# Patient Record
Sex: MALE | Race: WHITE | Employment: UNEMPLOYED | URBAN - METROPOLITAN AREA
[De-identification: names, ages, dates, MRNs, and addresses within clinical notes are randomized per-mention and may not be internally consistent; named-entity substitution may affect disease eponyms.]

---

## 2024-03-25 ENCOUNTER — APPOINTMENT (OUTPATIENT)
Dept: GENERAL RADIOLOGY | Age: 34
End: 2024-03-25
Payer: MEDICAID

## 2024-03-25 ENCOUNTER — APPOINTMENT (OUTPATIENT)
Dept: CT IMAGING | Age: 34
End: 2024-03-25
Payer: MEDICAID

## 2024-03-25 ENCOUNTER — HOSPITAL ENCOUNTER (OUTPATIENT)
Age: 34
Setting detail: OBSERVATION
Discharge: LEFT BEFORE DISCHARGE | End: 2024-03-25
Attending: EMERGENCY MEDICINE | Admitting: INTERNAL MEDICINE
Payer: MEDICAID

## 2024-03-25 VITALS
HEIGHT: 67 IN | DIASTOLIC BLOOD PRESSURE: 93 MMHG | HEART RATE: 83 BPM | RESPIRATION RATE: 16 BRPM | WEIGHT: 117.5 LBS | SYSTOLIC BLOOD PRESSURE: 140 MMHG | BODY MASS INDEX: 18.44 KG/M2 | TEMPERATURE: 98.4 F | OXYGEN SATURATION: 96 %

## 2024-03-25 DIAGNOSIS — R79.89 ELEVATED TROPONIN: ICD-10-CM

## 2024-03-25 DIAGNOSIS — R55 SYNCOPE AND COLLAPSE: Primary | ICD-10-CM

## 2024-03-25 LAB
ALBUMIN SERPL-MCNC: 3.8 G/DL (ref 3.4–5)
ALBUMIN/GLOB SERPL: 1.4 {RATIO} (ref 1.1–2.2)
ALP SERPL-CCNC: 52 U/L (ref 40–129)
ALT SERPL-CCNC: 10 U/L (ref 10–40)
ANION GAP SERPL CALCULATED.3IONS-SCNC: 10 MMOL/L (ref 3–16)
AST SERPL-CCNC: 15 U/L (ref 15–37)
BASOPHILS # BLD: 0 K/UL (ref 0–0.2)
BASOPHILS NFR BLD: 0 %
BILIRUB SERPL-MCNC: <0.2 MG/DL (ref 0–1)
BUN SERPL-MCNC: 27 MG/DL (ref 7–20)
CALCIUM SERPL-MCNC: 9.1 MG/DL (ref 8.3–10.6)
CHLORIDE SERPL-SCNC: 104 MMOL/L (ref 99–110)
CK SERPL-CCNC: 126 U/L (ref 39–308)
CO2 SERPL-SCNC: 25 MMOL/L (ref 21–32)
CREAT SERPL-MCNC: 1.2 MG/DL (ref 0.9–1.3)
DEPRECATED RDW RBC AUTO: 13.7 % (ref 12.4–15.4)
EKG ATRIAL RATE: 87 BPM
EKG DIAGNOSIS: NORMAL
EKG P AXIS: 61 DEGREES
EKG P-R INTERVAL: 122 MS
EKG Q-T INTERVAL: 392 MS
EKG QRS DURATION: 88 MS
EKG QTC CALCULATION (BAZETT): 471 MS
EKG R AXIS: 83 DEGREES
EKG T AXIS: 74 DEGREES
EKG VENTRICULAR RATE: 87 BPM
EOSINOPHIL # BLD: 0.2 K/UL (ref 0–0.6)
EOSINOPHIL NFR BLD: 2 %
GFR SERPLBLD CREATININE-BSD FMLA CKD-EPI: >60 ML/MIN/{1.73_M2}
GLUCOSE SERPL-MCNC: 93 MG/DL (ref 70–99)
HCT VFR BLD AUTO: 30.6 % (ref 40.5–52.5)
HGB BLD-MCNC: 10.1 G/DL (ref 13.5–17.5)
LIPASE SERPL-CCNC: 27 U/L (ref 13–60)
LYMPHOCYTES # BLD: 2.5 K/UL (ref 1–5.1)
LYMPHOCYTES NFR BLD: 22 %
MCH RBC QN AUTO: 29.2 PG (ref 26–34)
MCHC RBC AUTO-ENTMCNC: 33.2 G/DL (ref 31–36)
MCV RBC AUTO: 87.9 FL (ref 80–100)
MONOCYTES # BLD: 0.2 K/UL (ref 0–1.3)
MONOCYTES NFR BLD: 2 %
NEUTROPHILS # BLD: 8.4 K/UL (ref 1.7–7.7)
NEUTROPHILS NFR BLD: 74 %
NT-PROBNP SERPL-MCNC: 451 PG/ML (ref 0–124)
PLATELET # BLD AUTO: 395 K/UL (ref 135–450)
PMV BLD AUTO: 8.4 FL (ref 5–10.5)
POTASSIUM SERPL-SCNC: 3.9 MMOL/L (ref 3.5–5.1)
PROT SERPL-MCNC: 6.6 G/DL (ref 6.4–8.2)
RBC # BLD AUTO: 3.48 M/UL (ref 4.2–5.9)
SLIDE REVIEW: ABNORMAL
SODIUM SERPL-SCNC: 139 MMOL/L (ref 136–145)
TROPONIN, HIGH SENSITIVITY: 62 NG/L (ref 0–22)
TROPONIN, HIGH SENSITIVITY: 64 NG/L (ref 0–22)
WBC # BLD AUTO: 11.4 K/UL (ref 4–11)

## 2024-03-25 PROCEDURE — G0378 HOSPITAL OBSERVATION PER HR: HCPCS

## 2024-03-25 PROCEDURE — 99285 EMERGENCY DEPT VISIT HI MDM: CPT

## 2024-03-25 PROCEDURE — 83880 ASSAY OF NATRIURETIC PEPTIDE: CPT

## 2024-03-25 PROCEDURE — 70450 CT HEAD/BRAIN W/O DYE: CPT

## 2024-03-25 PROCEDURE — 93010 ELECTROCARDIOGRAM REPORT: CPT | Performed by: INTERNAL MEDICINE

## 2024-03-25 PROCEDURE — 80053 COMPREHEN METABOLIC PANEL: CPT

## 2024-03-25 PROCEDURE — 71045 X-RAY EXAM CHEST 1 VIEW: CPT

## 2024-03-25 PROCEDURE — 85025 COMPLETE CBC W/AUTO DIFF WBC: CPT

## 2024-03-25 PROCEDURE — 82550 ASSAY OF CK (CPK): CPT

## 2024-03-25 PROCEDURE — 83690 ASSAY OF LIPASE: CPT

## 2024-03-25 PROCEDURE — 94760 N-INVAS EAR/PLS OXIMETRY 1: CPT

## 2024-03-25 PROCEDURE — 84484 ASSAY OF TROPONIN QUANT: CPT

## 2024-03-25 PROCEDURE — 93005 ELECTROCARDIOGRAM TRACING: CPT | Performed by: EMERGENCY MEDICINE

## 2024-03-25 PROCEDURE — 71260 CT THORAX DX C+: CPT

## 2024-03-25 PROCEDURE — 6360000004 HC RX CONTRAST MEDICATION: Performed by: EMERGENCY MEDICINE

## 2024-03-25 PROCEDURE — 2580000003 HC RX 258: Performed by: INTERNAL MEDICINE

## 2024-03-25 RX ORDER — SODIUM CHLORIDE 9 MG/ML
INJECTION, SOLUTION INTRAVENOUS PRN
Status: DISCONTINUED | OUTPATIENT
Start: 2024-03-25 | End: 2024-03-25 | Stop reason: HOSPADM

## 2024-03-25 RX ORDER — ENOXAPARIN SODIUM 100 MG/ML
40 INJECTION SUBCUTANEOUS DAILY
Status: DISCONTINUED | OUTPATIENT
Start: 2024-03-25 | End: 2024-03-25 | Stop reason: HOSPADM

## 2024-03-25 RX ORDER — POLYETHYLENE GLYCOL 3350 17 G/17G
17 POWDER, FOR SOLUTION ORAL DAILY PRN
Status: DISCONTINUED | OUTPATIENT
Start: 2024-03-25 | End: 2024-03-25 | Stop reason: HOSPADM

## 2024-03-25 RX ORDER — MAGNESIUM HYDROXIDE/ALUMINUM HYDROXICE/SIMETHICONE 120; 1200; 1200 MG/30ML; MG/30ML; MG/30ML
30 SUSPENSION ORAL EVERY 6 HOURS PRN
Status: DISCONTINUED | OUTPATIENT
Start: 2024-03-25 | End: 2024-03-25 | Stop reason: HOSPADM

## 2024-03-25 RX ORDER — ONDANSETRON 4 MG/1
4 TABLET, ORALLY DISINTEGRATING ORAL EVERY 8 HOURS PRN
Status: DISCONTINUED | OUTPATIENT
Start: 2024-03-25 | End: 2024-03-25 | Stop reason: HOSPADM

## 2024-03-25 RX ORDER — MAGNESIUM SULFATE IN WATER 40 MG/ML
2000 INJECTION, SOLUTION INTRAVENOUS PRN
Status: DISCONTINUED | OUTPATIENT
Start: 2024-03-25 | End: 2024-03-25 | Stop reason: HOSPADM

## 2024-03-25 RX ORDER — SODIUM CHLORIDE 9 MG/ML
INJECTION, SOLUTION INTRAVENOUS CONTINUOUS
Status: DISCONTINUED | OUTPATIENT
Start: 2024-03-25 | End: 2024-03-25 | Stop reason: HOSPADM

## 2024-03-25 RX ORDER — POTASSIUM CHLORIDE 7.45 MG/ML
10 INJECTION INTRAVENOUS PRN
Status: DISCONTINUED | OUTPATIENT
Start: 2024-03-25 | End: 2024-03-25 | Stop reason: HOSPADM

## 2024-03-25 RX ORDER — POTASSIUM CHLORIDE 20 MEQ/1
40 TABLET, EXTENDED RELEASE ORAL PRN
Status: DISCONTINUED | OUTPATIENT
Start: 2024-03-25 | End: 2024-03-25 | Stop reason: HOSPADM

## 2024-03-25 RX ORDER — ACETAMINOPHEN 650 MG/1
650 SUPPOSITORY RECTAL EVERY 6 HOURS PRN
Status: DISCONTINUED | OUTPATIENT
Start: 2024-03-25 | End: 2024-03-25 | Stop reason: HOSPADM

## 2024-03-25 RX ORDER — ACETAMINOPHEN 325 MG/1
650 TABLET ORAL EVERY 6 HOURS PRN
Status: DISCONTINUED | OUTPATIENT
Start: 2024-03-25 | End: 2024-03-25 | Stop reason: HOSPADM

## 2024-03-25 RX ORDER — SODIUM CHLORIDE 0.9 % (FLUSH) 0.9 %
5-40 SYRINGE (ML) INJECTION EVERY 12 HOURS SCHEDULED
Status: DISCONTINUED | OUTPATIENT
Start: 2024-03-25 | End: 2024-03-25 | Stop reason: HOSPADM

## 2024-03-25 RX ORDER — SODIUM CHLORIDE 0.9 % (FLUSH) 0.9 %
5-40 SYRINGE (ML) INJECTION PRN
Status: DISCONTINUED | OUTPATIENT
Start: 2024-03-25 | End: 2024-03-25 | Stop reason: HOSPADM

## 2024-03-25 RX ORDER — ONDANSETRON 2 MG/ML
4 INJECTION INTRAMUSCULAR; INTRAVENOUS EVERY 6 HOURS PRN
Status: DISCONTINUED | OUTPATIENT
Start: 2024-03-25 | End: 2024-03-25 | Stop reason: HOSPADM

## 2024-03-25 RX ADMIN — Medication 10 ML: at 08:54

## 2024-03-25 RX ADMIN — IOPAMIDOL 75 ML: 755 INJECTION, SOLUTION INTRAVENOUS at 03:10

## 2024-03-25 ASSESSMENT — LIFESTYLE VARIABLES
HOW OFTEN DO YOU HAVE A DRINK CONTAINING ALCOHOL: NEVER
HOW MANY STANDARD DRINKS CONTAINING ALCOHOL DO YOU HAVE ON A TYPICAL DAY: PATIENT DOES NOT DRINK

## 2024-03-25 NOTE — ED TRIAGE NOTES
Patient arrived alert and orientated x4 via ems.  Patient had a syncopal episode after a bowl movement.  Patient states he believes he hit his head near the shower.  Patient was recently discharge after being at [facility] for overdose that resulted in a NSTEM, rhabdomyolysis, and left arm injury.  Patient breathing is even and non labored.  Patient was able to ambulate to the stretcher.  NIH is zero.  Patient denies chest pain at this time.

## 2024-03-25 NOTE — PROGRESS NOTES
Patient requesting to sign out AMA. I strongly encouraged him to stay for medical treatment and to be evaluated by cardiology, wound care, and the hospitalist. He stated, \"I can't be in a hospital anymore, I have to leave.\" I called patient's brother, Hussain and updated him. He asked how he can help his brother get treatment, and I informed him that the patient really needs to have his wounds seen by medical staff but we cannot keep him here against his will unfortunately. He verbalized understanding. Dr. Godfrey notified, awaiting reply. IV and telemetry removed without difficulty. AMA form signed.     1110: Dr. Godfrey at bedside talking with patient. He still requested to leave AMA. Patient taken outside via wheelchair with brother. Electronically signed by Gregg Haines RN on 3/25/2024 at 11:13 AM    1120: safe care submitted Electronically signed by Gregg Haines RN on 3/25/2024 at 11:20 AM

## 2024-03-25 NOTE — ED NOTES
Report given to NOAH Ma. ANNETTEAR discussed. All questions answered. RN verbalized understanding.

## 2024-03-25 NOTE — PROGRESS NOTES
0609 Report received from NOAH cabral. No questions at this time, no questions at this time.     0643 patient arrived on floor via stretcher, telemetry activated, VSS. Call light in reach, bed in lowest position. No distress noted on exam. No further needs at this time.

## 2024-03-25 NOTE — DISCHARGE SUMMARY
V2.0  Discharge Summary    Name:  Carlos Pak /Age/Sex: 1990 (34 y.o. male)   Admit Date: 3/25/2024  Discharge Date: 3/25/24    MRN & CSN:  [id number] & [id number] Encounter Date and Time 3/25/24 3:22 PM EDT    Attending:  No att. providers found Discharging Provider: Dylan Godfrey MD       Hospital Course:     Brief HPI: Carlos Pak is a 34 y.o. male who presented with syncope    Brief Problem Based Course:   Syncope: Patient presented to the hospital overnight after several syncopal episodes.  Most recent 1 occurred after having a bowel movement.  He was admitted for further evaluation and workup.  However, patient requested to leave AMA.  Patient was heavily encouraged to remain to be evaluated and informed of the risks of leaving without evaluation up to and including death. Patient stated understanding and insisted on leaving. Signed AMA papers and discharged against medical advice.  Patient was instructed to please return to the hospital if any issues      The patient expressed appropriate understanding of, and agreement with the discharge recommendations, medications, and plan.     Consults this admission:  IP CONSULT TO CARDIOLOGY  IP CONSULT TO CARDIOLOGY    Discharge Diagnosis:   Syncope and collapse      Discharge Instruction:   Follow up appointments:   Disposition: Discharged AMA    Discharge Medications:        Medication List      You have not been prescribed any medications.        Objective Findings at Discharge:   BP (!) 140/93   Pulse 83   Temp 98.4 °F (36.9 °C) (Oral)   Resp 16   Ht 1.702 m (5' 7\")   Wt 53.3 kg (117 lb 8.1 oz)   SpO2 96%   BMI 18.40 kg/m²       Physical Exam:     General: Appears anxious  Eyes: EOMI  Cardiovascular: Regular rate.  Musculoskeletal: No edema  Skin: warm, multiple wounds on bilateral LE   Neuro: Alert.  Psych: Mood appropriate.         Labs and Imaging   CT CHEST PULMONARY EMBOLISM W CONTRAST    Result Date: 3/25/2024  EXAMINATION: CTA OF THE

## 2024-03-25 NOTE — H&P
V2.0  History and Physical      Name:  Carlos Pak /Age/Sex: 1990  (34 y.o. male)   MRN & CSN:  [id number] & [id number] Encounter Date/Time: 3/25/2024 6:06 AM EDT   Location:   PCP: No primary care provider on file.       Hospital Day: 1    Assessment and Plan:   Carlos Pak is a 34 y.o. male smoker with a pmh of opioid abuse, recent NSTEMI and CVA who presents with Syncope and collapse after having a bowel movement.    Hospital Problems             Last Modified POA    * (Principal) Syncope and collapse 3/25/2024 Yes       Plan:  Monitor on telemetry, cardiology consult    Disposition:   Current Living situation: Home  Expected Disposition: Home  Estimated D/C: 1 to 2 days    Diet ADULT DIET; Regular; Low Fat/Low Chol/High Fiber/VALENTÍN   DVT Prophylaxis [] Lovenox, []  Heparin, [] SCDs, [] Ambulation,  [] Eliquis, [] Xarelto, [] Coumadin   Code Status Full Code   Surrogate Decision Maker/ POA Grandmother     Personally reviewed Lab Studies and Imaging     Discussed management of the case with ED provider who recommended admission    EKG interpreted personally and results normal.    Imaging that was interpreted personally includes chest x-ray and results negative for any acute cardiopulmonary process.    Drugs that require monitoring for toxicity include none and the method of monitoring was n/a.        History from:     patient    History of Present Illness:     Chief Complaint: \"I passed out\"  Carlos Pak is a 34 y.o. male smoker with pmh of opioid abuse, recent NSTEMI and CVA after overdosing on opioids who presents with several syncopal episodes the most recent when he got up after a bowel movement.  He denies any chest pain, shortness of breath, palpitations or lightheadedness, prior fevers, chills, cough, headache, malaise, fatigue, nausea, vomiting or diarrhea.    In emergency room his temperature was 36.7, blood pressure 118/81, pulse 91, respirations 16, sats 100% on room air,

## 2024-03-25 NOTE — PROGRESS NOTES
[x] Discussed risk factors for leaving hospital against medical advice, up to and including risk of death  [x] Patient is NOT on an involuntary hold  [x] Patient is alert and oriented  [x] Attempt made to identify patient's reason for wanting to leave AMA \"I can't stand hospitals\"   [x] Attempt made to reduce any barriers to patient remaining inpatient, MD at bedside.  [x] Patient verbalizes understanding that he is leaving prior to completion of treatment   [x] Patient's provider Dr. Godfrey has been notified of patient's desire to leave AMA  [] Prescriptions provided, and reviewed with patient  [x] Patient declined to wait for physician to write prescriptions  [] Provider is not providing prescriptions  [x] Attempt made to arrange follow up care, he refused to wait. Strongly encouraged him to have his wounds seen by medical staff.  [x] Attempt made to identify patient's arrangements for safe transportation from facility  [x] AMA form signed  [] Patient refused to sign AMA form  [x] IV accesses discontinued as appropriate  [x] Patient is invited to return to the emergency department if further treatment if needed   [x] Charge nurse/nursing supervisor has been informed            Electronically signed by Gregg Haines RN on 3/25/2024 at 11:13 AM

## 2024-03-25 NOTE — ED PROVIDER NOTES
CT CHEST PULMONARY EMBOLISM W CONTRAST    Result Date: 3/25/2024  1. No evidence of pulmonary embolus or acute pulmonary abnormality     CT HEAD WO CONTRAST    Result Date: 3/25/2024  1. No acute intracranial abnormality. 2. Partial opacification of the mastoid air cells bilaterally.  Please correlate with clinical symptoms of mastoid inflammation.     XR CHEST PORTABLE    Result Date: 3/25/2024  EXAMINATION: ONE XRAY VIEW OF THE CHEST 3/25/2024 1:25 am COMPARISON: None. HISTORY: ORDERING SYSTEM PROVIDED HISTORY: sob TECHNOLOGIST PROVIDED HISTORY: Reason for exam:->sob Reason for Exam: Loss of Conscousness FINDINGS: The cardiomediastinal silhouette is within normal range. Lungs are clear. There is no focal pulmonary consolidation, pleural effusion, pneumothorax, or evidence of airspace pulmonary edema.     No acute cardiopulmonary process.       No results found.    PROCEDURES   Unless otherwise noted below, none     Procedures    CRITICAL CARE TIME (.cct)     Critical Care  There was a high probability of life-threatening clinical deterioration in the patient's condition requiring my urgent intervention.  Total critical care time with the patient was 35 minutes excluding separately reportable procedures.  Critical care required due to patients syncope with elevated troponin.       PAST MEDICAL HISTORY      has a past medical history of Bronchitis.     EMERGENCY DEPARTMENT COURSE and DIFFERENTIAL DIAGNOSIS/MDM:   Vitals:    Vitals:    03/25/24 0330 03/25/24 0424 03/25/24 0515 03/25/24 0516   BP:   119/86    Pulse: 96 89 86 84   Resp: 19 20 14 14   Temp:       TempSrc:       SpO2: 99% 99%  97%   Weight:           Patient was given the following medications:  Medications   iopamidol (ISOVUE-370) 76 % injection 75 mL (75 mLs IntraVENous Given 3/25/24 0310)             Is this patient to be included in the SEP-1 Core Measure due to severe sepsis or septic shock?   No   Exclusion criteria - the patient is NOT to be